# Patient Record
(demographics unavailable — no encounter records)

---

## 2025-03-26 NOTE — HISTORY OF PRESENT ILLNESS
[FreeTextEntry8] : 29 yr old with MS, preDM, here in f/u; lost her mother a year ago. Remains on Ocrevis for MS. she had a very unusual body rash last month in the office and was referred to Derm for a second opinion; unfortunately got stranded at airport couldn't make appt to see Dr Orourke; had had an outside biopsy then brings path report see data below; she had high A1C, is here in f/u Requests valtrex refill takes prn outbreak. Rash has improved on tacrolimus 0.1% and she has f/u w outside derm today Dr Amato. In the past had rheum workup due to elevated dsDNA; her neurologist has ascribed this to the MS Now off prednisone, and will recheck WBC today taking ASA due to high plt..   2025   29 yr old woman with MS, impaired glucose tolerance, here for CPE; last here in . Her mother  at 61 from autoimmune hepatitis/ liver failure at [facility] in 2024. She stopped seeing doctors after that. Around that time developed scattered itchy red patches on her face, arms, and abdomen/ chest/ breasts/ back and legs.  This is currently her main concern. Has seen Savita Amato DO who recently prescribed prednisone taper over 21 days; no cream has worked. she was told it may be eczema. Her MS has been stable. She remains on Ocrevis. She is now an  for [employer] and loves traveling to [place], [place], and [facility]. No smoking, rare ETOH.    2022  26 yr old with MS, impaired glucose tolerance here in f/u. She is full time in school studying to be an , enjoying it. She feels well. Next Ocrevus in 2022. Has tried to decrease chocolate intake. Has new nephew at home 3 week old Solitario (sister is 17) ; she sees her boyfriend on the weekends in [place]; she works out regularly, feels well.  2022  26 yr old with MS here in follow up. Had Ocrevus in 2022, and will continue q 6 months. Continues to see Dr Dewayne Pond, will see her next week.  No new symptoms of MS since 2021.  She has had nausea since Ocrevus infusion. Particularly post-prandial. No vomiting, no fever, abdominal pain.  Needs hep A/B booster - will check levels after.   Working hard.   2021  26 yr old with MS here for physical. Seeing Neurologist Dr Dewayne Pond at [facility]; brings list of labs requested by her neurologist to be drawn today.  Seeing this neuro as well as her primary Neuro Dr Olu Roque for MS. Last saw him 2021. Due to change in insurance she is seeing the other neuro at the moment. Last invustion of Ocrevus was in  and next this month coming up. She feels well in general; needs Valtrex refill. Needs Pfizer booster; had J&J and checked w Neuro.  2021  25 yr old with MS and history MRSA infection here in follow up. 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 having fun. A1C 5.9% in 10/2020 has been eating less chocolate  Needs to check hep B status  52 yr old Mother (with history of lung cancer )  hospitalized at [facility] for 1 month with COVID, required ventilation; in 2020, she is fully recovered.  Sendy had J&J vaccinated in 3/2021.  Had first infustion for MS of Ocrevus 2 weeks ago (in Dr Roque and has had intermittent fatigue yet, but generally feels well. Expects Ocrevus injections x 3 over 1st year; then twice a year.   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  We discussed; she has tried topicals suggested by her derm in the past, but this has not helped.  No lesion was actually expressing fluid today, so no cx could be obtained.

## 2025-03-26 NOTE — ASSESSMENT
[FreeTextEntry1] :   29 yr old [facility] diagnosis of MS, on Ocrevis  history of serology suggestive of autoimmune disease and several months of marked delineated rash over body which responds best to tacrolimus  now off prednisone, will repeat CBC has f/u w Hematology and 2nd opinion with Dermatology  f/u pending labs today

## 2025-05-06 NOTE — ASSESSMENT
[FreeTextEntry1] :   discussed poss eczema/  Dupixent /  f/u Dr Orourke   elevated A1C  MS   f/u pending labs today;  will resend metformin to [place] if needed

## 2025-05-06 NOTE — HISTORY OF PRESENT ILLNESS
[FreeTextEntry1] : NP - Eczema [de-identified] : Jil Hughes 30 y/o F presents for eczema flare-up.  referred by Dr. Gamble   present for a little over a year spread on the chest, hands today improving on the chest still flares a little on the lower eyelids, neck tried triamcinolone 0.1% cream - did not help, then tried tacrolimus 0.1% ointment which helped uses betamethasone on the hands with improvement had an outside biopsy on the L neck about 2 mos ago - showed eczema pt is a ; after a long flight notices worsening rash uses cetaphil, dove soap wears perfume on the clothing  reports a hx of eczema and asthma during childhood  PHx of skin cancer: no FHx of skin cancer: no Hx of blistering sunburns: no Hx of tanning bed use: no Uses sunscreen regularly: no

## 2025-05-06 NOTE — PHYSICAL EXAM
[Alert] : alert [Oriented x 3] : ~L oriented x 3 [FreeTextEntry3] : Focused exam: -pink scaly patches on the neck, upper chest, bl dorsal hands

## 2025-05-06 NOTE — ASSESSMENT
[FreeTextEntry1] : #Atopic dermatitis #Xerosis -chronic, flaring -I have discussed the chronic nature and course of this condition -outside bx c/w eczema possible component of ACD - stop perfume, even on clothing recommend vanicream products, fragrance free products discussed patch testing - will provide referral list discussed dupixent after patch testing if eczema still recalcitrant given improvement on topicals, she will continue for now continue protopic ointment bid to aa on the neck/chest continue betamethasone bid to aa on the hands prn flare  RTC PRN

## 2025-05-06 NOTE — HISTORY OF PRESENT ILLNESS
[FreeTextEntry8] : 29 yr old with MS and preDM here in f/u.  She didn't get the message re starting metformin. stopped prednisone about a month ago. consulted Dr Orourke who thinks her rash is likely eczema; will get allergy testing. leaving [place] for [place]   [facility]  [phone]   with her boyfriend Ismael   from there they go to  [place] on [address] to celebrate his birthday and she wants to be reached on his phone as hers doesn't work abroad.   Feels well overall.  3/26/2025   29 yr old with MS, preDM, here in f/u; lost her mother a year ago. Remains on Ocrevis for MS. she had a very unusual body rash last month in the office and was referred to Derm for a second opinion; unfortunately got stranded at airport couldn't make appt to see Dr Orourke; had had an outside biopsy then brings path report see data below; she had high A1C, is here in f/u Requests valtrex refill takes prn outbreak. Rash has improved on tacrolimus 0.1% and she has f/u w outside derm today Dr Amato. In the past had rheum workup due to elevated dsDNA; her neurologist has ascribed this to the MS Now off prednisone, and will recheck WBC today taking ASA due to high plt..   2025   29 yr old woman with MS, impaired glucose tolerance, here for CPE; last here in . Her mother  at 61 from autoimmune hepatitis/ liver failure at [facility] in 2024. She stopped seeing doctors after that. Around that time developed scattered itchy red patches on her face, arms, and abdomen/ chest/ breasts/ back and legs.  This is currently her main concern. Has seen Savita Amato DO who recently prescribed prednisone taper over 21 days; no cream has worked. she was told it may be eczema. Her MS has been stable. She remains on Ocrevis. She is now an  for [employer] and loves traveling to [place], [place], and [facility]. No smoking, rare ETOH.    2022  26 yr old with MS, impaired glucose tolerance here in f/u. She is full time in school studying to be an , enjoying it. She feels well. Next [employer] in 2022. Has tried to decrease chocolate intake. Has new nephew at home 3 week old Solitario (sister is 17) ; she sees her boyfriend on the weekends in [place]; she works out regularly, feels well.  2022  26 yr old with MS here in follow up. Had Ocrevus in 2022, and will continue q 6 months. Continues to see Dr Dewayne Pond, will see her next week.  No new symptoms of MS since 2021.  She has had nausea since Ocrevus infusion. Particularly post-prandial. No vomiting, no fever, abdominal pain.  Needs hep A/B booster - will check levels after.   Working hard.   2021  26 yr old with MS here for physical. Seeing Neurologist Dr Dewayne Pond at [facility]; brings list of labs requested by her neurologist to be drawn today.  Seeing this neuro as well as her primary Neuro Dr Olu Roque for MS. Last saw him 2021. Due to change in insurance she is seeing the other neuro at the moment. Last invustion of Ocrevus was in  and next this month coming up. She feels well in general; needs Valtrex refill. Needs Pfizer booster; had J&J and checked w Neuro.  2021  25 yr old with MS and history MRSA infection here in follow up. 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 A1C 5.9% in 10/2020 has been eating less chocolate  Needs to check hep B status  52 yr old Mother (with history of lung cancer )  hospitalized at [facility] for 1 month with COVID, required ventilation; in 2020, she is fully recovered.  Sendy had J&J vaccinated in 3/2021.  Had first infustion for MS of Ocrevus 2 weeks ago (in Dr Roque and has had intermittent fatigue yet, but generally feels well. Expects Ocrevus injections x 3 over 1st year; then twice a year.   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  We discussed; she has tried topicals suggested by her derm in the past, but this has not helped.  No lesion was actually expressing fluid today, so no cx could be obtained.